# Patient Record
Sex: FEMALE | Race: WHITE | Employment: STUDENT | URBAN - METROPOLITAN AREA
[De-identification: names, ages, dates, MRNs, and addresses within clinical notes are randomized per-mention and may not be internally consistent; named-entity substitution may affect disease eponyms.]

---
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
## 2025-01-09 ENCOUNTER — TELEPHONE (OUTPATIENT)
Dept: FAMILY MEDICINE CLINIC | Facility: CLINIC | Age: 20
End: 2025-01-09

## 2025-01-17 ENCOUNTER — HOSPITAL ENCOUNTER (EMERGENCY)
Facility: HOSPITAL | Age: 20
Discharge: HOME | End: 2025-01-17
Attending: EMERGENCY MEDICINE
Payer: MEDICARE

## 2025-01-17 VITALS
RESPIRATION RATE: 18 BRPM | DIASTOLIC BLOOD PRESSURE: 58 MMHG | OXYGEN SATURATION: 99 % | TEMPERATURE: 98.2 F | SYSTOLIC BLOOD PRESSURE: 114 MMHG | HEART RATE: 82 BPM

## 2025-01-17 DIAGNOSIS — R19.7 DIARRHEA, UNSPECIFIED TYPE: ICD-10-CM

## 2025-01-17 DIAGNOSIS — R11.10 VOMITING: ICD-10-CM

## 2025-01-17 DIAGNOSIS — R10.84 GENERALIZED ABDOMINAL PAIN: Primary | ICD-10-CM

## 2025-01-17 LAB
ALBUMIN SERPL BCG-MCNC: 4.1 G/DL (ref 3.5–5)
ALP SERPL-CCNC: 61 U/L (ref 34–104)
ALT SERPL W P-5'-P-CCNC: 13 U/L (ref 7–52)
AMPHETAMINES SERPL QL SCN: NEGATIVE
ANION GAP SERPL CALCULATED.3IONS-SCNC: 3 MMOL/L (ref 4–13)
AST SERPL W P-5'-P-CCNC: 19 U/L (ref 13–39)
BARBITURATES UR QL: NEGATIVE
BASOPHILS # BLD AUTO: 0.04 THOUSANDS/ΜL (ref 0–0.1)
BASOPHILS NFR BLD AUTO: 1 % (ref 0–1)
BENZODIAZ UR QL: NEGATIVE
BILIRUB SERPL-MCNC: 0.39 MG/DL (ref 0.2–1)
BILIRUB UR QL STRIP: NEGATIVE
BUN SERPL-MCNC: 15 MG/DL (ref 5–25)
CALCIUM SERPL-MCNC: 9 MG/DL (ref 8.4–10.2)
CHLORIDE SERPL-SCNC: 106 MMOL/L (ref 96–108)
CLARITY UR: CLEAR
CO2 SERPL-SCNC: 29 MMOL/L (ref 21–32)
COCAINE UR QL: NEGATIVE
COLOR UR: YELLOW
CREAT SERPL-MCNC: 0.69 MG/DL (ref 0.6–1.3)
EOSINOPHIL # BLD AUTO: 0.09 THOUSAND/ΜL (ref 0–0.61)
EOSINOPHIL NFR BLD AUTO: 1 % (ref 0–6)
ERYTHROCYTE [DISTWIDTH] IN BLOOD BY AUTOMATED COUNT: 12.9 % (ref 11.6–15.1)
EXT PREGNANCY TEST URINE: NEGATIVE
FENTANYL UR QL SCN: NEGATIVE
GFR SERPL CREATININE-BSD FRML MDRD: 125 ML/MIN/1.73SQ M
GLUCOSE SERPL-MCNC: 71 MG/DL (ref 65–140)
GLUCOSE UR STRIP-MCNC: NEGATIVE MG/DL
HCT VFR BLD AUTO: 41 % (ref 34.8–46.1)
HGB BLD-MCNC: 13.7 G/DL (ref 11.5–15.4)
HGB UR QL STRIP.AUTO: NEGATIVE
HYDROCODONE UR QL SCN: NEGATIVE
IMM GRANULOCYTES # BLD AUTO: 0.04 THOUSAND/UL (ref 0–0.2)
IMM GRANULOCYTES NFR BLD AUTO: 1 % (ref 0–2)
KETONES UR STRIP-MCNC: NEGATIVE MG/DL
LEUKOCYTE ESTERASE UR QL STRIP: NEGATIVE
LIPASE SERPL-CCNC: 19 U/L (ref 11–82)
LYMPHOCYTES # BLD AUTO: 2.36 THOUSANDS/ΜL (ref 0.6–4.47)
LYMPHOCYTES NFR BLD AUTO: 31 % (ref 14–44)
MCH RBC QN AUTO: 32.6 PG (ref 26.8–34.3)
MCHC RBC AUTO-ENTMCNC: 33.4 G/DL (ref 31.4–37.4)
MCV RBC AUTO: 98 FL (ref 82–98)
METHADONE UR QL: NEGATIVE
MONOCYTES # BLD AUTO: 0.51 THOUSAND/ΜL (ref 0.17–1.22)
MONOCYTES NFR BLD AUTO: 7 % (ref 4–12)
NEUTROPHILS # BLD AUTO: 4.61 THOUSANDS/ΜL (ref 1.85–7.62)
NEUTS SEG NFR BLD AUTO: 59 % (ref 43–75)
NITRITE UR QL STRIP: NEGATIVE
NRBC BLD AUTO-RTO: 0 /100 WBCS
OPIATES UR QL SCN: NEGATIVE
OXYCODONE+OXYMORPHONE UR QL SCN: NEGATIVE
PCP UR QL: NEGATIVE
PH UR STRIP.AUTO: 7.5 [PH] (ref 4.5–8)
PLATELET # BLD AUTO: 282 THOUSANDS/UL (ref 149–390)
PMV BLD AUTO: 9.9 FL (ref 8.9–12.7)
POTASSIUM SERPL-SCNC: 3.8 MMOL/L (ref 3.5–5.3)
PROT SERPL-MCNC: 6.3 G/DL (ref 6.4–8.4)
PROT UR STRIP-MCNC: NEGATIVE MG/DL
RBC # BLD AUTO: 4.2 MILLION/UL (ref 3.81–5.12)
SODIUM SERPL-SCNC: 138 MMOL/L (ref 135–147)
SP GR UR STRIP.AUTO: 1.02 (ref 1–1.03)
THC UR QL: POSITIVE
UROBILINOGEN UR QL STRIP.AUTO: 0.2 E.U./DL
WBC # BLD AUTO: 7.65 THOUSAND/UL (ref 4.31–10.16)

## 2025-01-17 PROCEDURE — 80053 COMPREHEN METABOLIC PANEL: CPT | Performed by: EMERGENCY MEDICINE

## 2025-01-17 PROCEDURE — 80307 DRUG TEST PRSMV CHEM ANLYZR: CPT | Performed by: EMERGENCY MEDICINE

## 2025-01-17 PROCEDURE — 85025 COMPLETE CBC W/AUTO DIFF WBC: CPT | Performed by: EMERGENCY MEDICINE

## 2025-01-17 PROCEDURE — 96374 THER/PROPH/DIAG INJ IV PUSH: CPT

## 2025-01-17 PROCEDURE — 96361 HYDRATE IV INFUSION ADD-ON: CPT

## 2025-01-17 PROCEDURE — 36415 COLL VENOUS BLD VENIPUNCTURE: CPT | Performed by: EMERGENCY MEDICINE

## 2025-01-17 PROCEDURE — 99283 EMERGENCY DEPT VISIT LOW MDM: CPT

## 2025-01-17 PROCEDURE — 83690 ASSAY OF LIPASE: CPT | Performed by: EMERGENCY MEDICINE

## 2025-01-17 PROCEDURE — 81025 URINE PREGNANCY TEST: CPT | Performed by: EMERGENCY MEDICINE

## 2025-01-17 PROCEDURE — 81003 URINALYSIS AUTO W/O SCOPE: CPT

## 2025-01-17 PROCEDURE — 99284 EMERGENCY DEPT VISIT MOD MDM: CPT | Performed by: EMERGENCY MEDICINE

## 2025-01-17 RX ORDER — ONDANSETRON 2 MG/ML
4 INJECTION INTRAMUSCULAR; INTRAVENOUS ONCE
Status: COMPLETED | OUTPATIENT
Start: 2025-01-17 | End: 2025-01-17

## 2025-01-17 RX ORDER — ONDANSETRON 4 MG/1
4 TABLET, ORALLY DISINTEGRATING ORAL EVERY 8 HOURS PRN
Qty: 10 TABLET | Refills: 0 | Status: SHIPPED | OUTPATIENT
Start: 2025-01-17 | End: 2025-01-21

## 2025-01-17 RX ADMIN — ONDANSETRON 4 MG: 2 INJECTION INTRAMUSCULAR; INTRAVENOUS at 17:01

## 2025-01-17 RX ADMIN — SODIUM CHLORIDE 1000 ML: 0.9 INJECTION, SOLUTION INTRAVENOUS at 17:01

## 2025-01-17 NOTE — ED PROVIDER NOTES
Time reflects when diagnosis was documented in both MDM as applicable and the Disposition within this note       Time User Action Codes Description Comment    1/17/2025  7:08 PM Corazon De Luna Add [R10.84] Generalized abdominal pain     1/17/2025  7:08 PM Corazon De Luna Add [R11.10] Vomiting     1/17/2025  7:08 PM Corazon De Luna Add [R19.7] Diarrhea, unspecified type           ED Disposition       ED Disposition   Discharge    Condition   Good    Date/Time   Fri Jan 17, 2025  7:08 PM    Comment   Soo Cleveland discharge to home/self care.                   Assessment & Plan       Medical Decision Making  20-year-old female presents to the ED with ongoing symptoms of nausea, vomiting, diarrhea and abdominal cramping.  She was seen here on 12/22/2024 for these symptoms had blood work and a CT which were essentially unremarkable.  She was given Zofran which helps her nausea and Bentyl which she states does not help her pain.  She was supposed to follow-up with gastroenterology but states she is having trouble getting in.  She states recently her nausea and vomiting has started up again.  She denies any new medications or supplements, drug or alcohol use.  She does not think she could be pregnant.  On exam she looks well in no distress.  She does not appear to be dehydrated.  She is normal weight.  Her abdomen is nondistended and nontender.  Will recheck labs, give IV fluids and Zofran.  Uncertain etiology of her nausea and vomiting but do not suspect acute surgical abdomen.  Will rule out pregnancy.  Could be irritable bowel syndrome or possible colitis.  Ultimately she will need a follow-up with gastroenterology.    Amount and/or Complexity of Data Reviewed  Labs: ordered. Decision-making details documented in ED Course.    Risk  Prescription drug management.        ED Course as of 01/17/25 1910 Fri Jan 17, 2025   1635 Blood Pressure: 114/58   1635 Temperature: 98.2 °F (36.8 °C)   1636 Pulse: 82  "  1636 Respirations: 18   1636 SpO2: 99 %   1842 PREGNANCY TEST URINE: Negative   1842 Color, UA: Yellow   1842 Leukocytes, UA: Negative   1842 Nitrite, UA: Negative   1842 Blood, UA: Negative   1905 THC URINE(!): Positive   1905 WBC: 7.65   1905 Hemoglobin: 13.7   1906 Sodium: 138   1906 Potassium: 3.8   1906 GLUCOSE: 71   1906 Calcium: 9.0   1906 AST: 19   1906 ALT: 13   1906 ALK PHOS: 61   1906 LIPASE: 19       Medications   sodium chloride 0.9 % bolus 1,000 mL (0 mL Intravenous Stopped 1/17/25 1801)   ondansetron (ZOFRAN) injection 4 mg (4 mg Intravenous Given 1/17/25 1701)       ED Risk Strat Scores            CRAFFT      Flowsheet Row Most Recent Value   CRAFFT Initial Screen: During the past 12 months, did you:    1. Drink any alcohol (more than a few sips)?  No Filed at: 01/17/2025 1624   2. Smoke any marijuana or hashish No Filed at: 01/17/2025 1624   3. Use anything else to get high? (\"anything else\" includes illegal drugs, over the counter and prescription drugs, and things that you sniff or 'gillis')? No Filed at: 01/17/2025 1624                                          History of Present Illness       Chief Complaint   Patient presents with    Abdominal Pain     Pt c/o abd pain and persistent nausea for over a month. States she was here and they gave her zofran and referred her to pcp and GI. Did not go to GI appointment because she missed it. Reports the zofran only takes the nausea away for a bit and then always comes back.        No past medical history on file.   No past surgical history on file.   Family History   Problem Relation Age of Onset    ADD / ADHD Mother     ADD / ADHD Father     Diabetes Maternal Grandmother       Social History     Tobacco Use    Smoking status: Never    Smokeless tobacco: Never   Substance Use Topics    Alcohol use: Not Currently    Drug use: Yes     Types: Marijuana      E-Cigarette/Vaping      E-Cigarette/Vaping Substances      I have reviewed and agree with the " history as documented.     20-year-old female with history of anxiety presents to the emergency department with recurrence of nausea, vomiting, diarrhea and abdominal pain.  Patient states that she has had symptoms for about a month.  She states she vomits almost every day.  The vomitus is nonbloody and nonbilious.  She occasionally is able to keep some food and liquids down.  She also complains of episodes of nonbloody diarrhea that can happen almost every day as well.  She has been having episodes of lightheadedness and abdominal cramping.  No fevers or chills.  No recent weight loss.  She was seen here on 12/22/2024 for these complaints and had blood work and a CT of the abdomen and pelvis which showed fluid-filled loops of bowel consistent with gastroenteritis.  Patient was prescribed Zofran and Bentyl.  She states that Zofran does help her with her nausea but the Bentyl does not help with the pain.  Patient denies any new medications or supplements.  No drug or alcohol use.  Patient was supposed to follow-up with gastroenterology but states she has not been able to make an appointment.      History provided by:  Patient   used: No    Abdominal Pain  Pain location:  Generalized  Pain quality: cramping and sharp    Pain radiates to:  Does not radiate  Onset quality:  Gradual  Duration:  4 weeks  Timing:  Intermittent  Progression:  Unchanged  Chronicity:  Recurrent  Context: not alcohol use, not awakening from sleep, not diet changes, not eating, not previous surgeries, not recent illness, not recent travel, not sick contacts, not suspicious food intake and not trauma    Relieved by:  Nothing  Worsened by:  Nothing  Ineffective treatments: bentyl.  Associated symptoms: belching, diarrhea, nausea and vomiting    Associated symptoms: no anorexia, no chest pain, no chills, no constipation, no cough, no dysuria, no fatigue, no fever, no flatus, no hematemesis, no hematochezia, no hematuria, no  melena, no shortness of breath, no sore throat, no vaginal bleeding and no vaginal discharge    Diarrhea:     Quality:  Watery    Number of occurrences:  Few episodes in a day, diarrhea does not occur every day    Duration:  3 weeks  Vomiting:     Quality:  Stomach contents    Number of occurrences:  Vomiting does not occur every day    Duration:  3 weeks    Progression:  Unchanged  Risk factors: no alcohol abuse, no aspirin use, no NSAID use, not obese and not pregnant        Review of Systems   Constitutional:  Positive for appetite change. Negative for activity change, chills, diaphoresis, fatigue, fever and unexpected weight change.   HENT: Negative.  Negative for congestion, rhinorrhea and sore throat.    Eyes: Negative.  Negative for discharge, redness and itching.   Respiratory: Negative.  Negative for apnea, cough, chest tightness, shortness of breath and wheezing.    Cardiovascular:  Negative for chest pain, palpitations and leg swelling.   Gastrointestinal:  Positive for abdominal pain, diarrhea, nausea and vomiting. Negative for abdominal distention, anal bleeding, anorexia, blood in stool, constipation, flatus, hematemesis, hematochezia, melena and rectal pain.   Endocrine: Negative.    Genitourinary: Negative.  Negative for dysuria, flank pain, frequency, hematuria, urgency, vaginal bleeding and vaginal discharge.   Musculoskeletal: Negative.  Negative for back pain.   Skin: Negative.    Allergic/Immunologic: Negative.    Neurological: Negative.  Negative for dizziness, syncope, weakness, light-headedness, numbness and headaches.   Hematological: Negative.    All other systems reviewed and are negative.          Objective       ED Triage Vitals [01/17/25 1619]   Temperature Pulse Blood Pressure Respirations SpO2 Patient Position - Orthostatic VS   98.2 °F (36.8 °C) 82 114/58 18 99 % Sitting      Temp Source Heart Rate Source BP Location FiO2 (%) Pain Score    Oral Monitor Right arm -- 4      Vitals       Date and Time Temp Pulse SpO2 Resp BP Pain Score FACES Pain Rating User   01/17/25 1619 98.2 °F (36.8 °C) 82 99 % 18 114/58 4 -- LB            Physical Exam  Vitals and nursing note reviewed.   Constitutional:       General: She is awake. She is not in acute distress.     Appearance: Normal appearance. She is well-developed and normal weight. She is not ill-appearing, toxic-appearing or diaphoretic.   HENT:      Head: Normocephalic and atraumatic.      Right Ear: External ear normal.      Left Ear: External ear normal.      Nose: Nose normal.      Mouth/Throat:      Mouth: Mucous membranes are moist.   Eyes:      General: No scleral icterus.        Right eye: No discharge.         Left eye: No discharge.      Conjunctiva/sclera: Conjunctivae normal.   Neck:      Thyroid: No thyromegaly.      Vascular: No JVD.      Trachea: No tracheal deviation.   Cardiovascular:      Rate and Rhythm: Normal rate and regular rhythm.      Heart sounds: Normal heart sounds. No murmur heard.     No friction rub. No gallop.   Pulmonary:      Effort: Pulmonary effort is normal. No respiratory distress.      Breath sounds: Normal breath sounds. No stridor. No wheezing, rhonchi or rales.   Chest:      Chest wall: No tenderness.   Abdominal:      General: Abdomen is flat. Bowel sounds are normal. There is no distension.      Palpations: Abdomen is soft. There is no mass.      Tenderness: There is no abdominal tenderness.      Hernia: No hernia is present.   Skin:     General: Skin is warm and dry.      Coloration: Skin is not jaundiced or pale.      Findings: No bruising, erythema, lesion or rash.   Neurological:      General: No focal deficit present.      Mental Status: She is alert and oriented to person, place, and time.      Motor: No weakness or abnormal muscle tone.      Deep Tendon Reflexes: Reflexes are normal and symmetric.   Psychiatric:         Behavior: Behavior is cooperative.         Results Reviewed       Procedure  Component Value Units Date/Time    CBC and differential [[id number]] Collected: 01/17/25 1701    Lab Status: Final result Specimen: Blood from Arm, Left Updated: 01/17/25 1905     WBC 7.65 Thousand/uL      RBC 4.20 Million/uL      Hemoglobin 13.7 g/dL      Hematocrit 41.0 %      MCV 98 fL      MCH 32.6 pg      MCHC 33.4 g/dL      RDW 12.9 %      MPV 9.9 fL      Platelets 282 Thousands/uL      nRBC 0 /100 WBCs      Segmented % 59 %      Immature Grans % 1 %      Lymphocytes % 31 %      Monocytes % 7 %      Eosinophils Relative 1 %      Basophils Relative 1 %      Absolute Neutrophils 4.61 Thousands/µL      Absolute Immature Grans 0.04 Thousand/uL      Absolute Lymphocytes 2.36 Thousands/µL      Absolute Monocytes 0.51 Thousand/µL      Eosinophils Absolute 0.09 Thousand/µL      Basophils Absolute 0.04 Thousands/µL     Rapid drug screen, urine [[id number]]  (Abnormal) Collected: 01/17/25 1828    Lab Status: Final result Specimen: Urine, Clean Catch Updated: 01/17/25 1904     Amph/Meth UR Negative     Barbiturate Ur Negative     Benzodiazepine Urine Negative     Cocaine Urine Negative     Methadone Urine Negative     Opiate Urine Negative     PCP Ur Negative     THC Urine Positive     Oxycodone Urine Negative     Fentanyl Urine Negative     HYDROCODONE URINE Negative    Narrative:      Presumptive report. If requested, specimen will be sent to reference lab for confirmation.  FOR MEDICAL PURPOSES ONLY.   IF CONFIRMATION NEEDED PLEASE CONTACT THE LAB WITHIN 5 DAYS.    Drug Screen Cutoff Levels:  AMPHETAMINE/METHAMPHETAMINES  1000 ng/mL  BARBITURATES     200 ng/mL  BENZODIAZEPINES     200 ng/mL  COCAINE      300 ng/mL  METHADONE      300 ng/mL  OPIATES      300 ng/mL  PHENCYCLIDINE     25 ng/mL  THC       50 ng/mL  OXYCODONE      100 ng/mL  FENTANYL      5 ng/mL  HYDROCODONE     300 ng/mL    Comprehensive metabolic panel [[id number]]  (Abnormal) Collected: 01/17/25 1701    Lab Status: Final result Specimen: Blood from Arm,  Left Updated: 01/17/25 1842     Sodium 138 mmol/L      Potassium 3.8 mmol/L      Chloride 106 mmol/L      CO2 29 mmol/L      ANION GAP 3 mmol/L      BUN 15 mg/dL      Creatinine 0.69 mg/dL      Glucose 71 mg/dL      Calcium 9.0 mg/dL      AST 19 U/L      ALT 13 U/L      Alkaline Phosphatase 61 U/L      Total Protein 6.3 g/dL      Albumin 4.1 g/dL      Total Bilirubin 0.39 mg/dL      eGFR 125 ml/min/1.73sq m     Narrative:      National Kid[employer] Foundation guidelines for Chronic Kidney Disease (CKD):     Stage 1 with normal or high GFR (GFR > 90 mL/min/1.73 square meters)    Stage 2 Mild CKD (GFR = 60-89 mL/min/1.73 square meters)    Stage 3A Moderate CKD (GFR = 45-59 mL/min/1.73 square meters)    Stage 3B Moderate CKD (GFR = 30-44 mL/min/1.73 square meters)    Stage 4 Severe CKD (GFR = 15-29 mL/min/1.73 square meters)    Stage 5 End Stage CKD (GFR <15 mL/min/1.73 square meters)  Note: GFR calculation is accurate only with a steady state creatinine    Lipase [[id number]]  (Normal) Collected: 01/17/25 1701    Lab Status: Final result Specimen: Blood from Arm, Left Updated: 01/17/25 1842     Lipase 19 u/L     POCT pregnancy, urine [[id number]]  (Normal) Collected: 01/17/25 1834    Lab Status: Final result Updated: 01/17/25 1834     EXT Preg Test, Ur Negative     Control --    Urine Macroscopic, POC [[id number]] Collected: 01/17/25 1831    Lab Status: Final result Specimen: Urine Updated: 01/17/25 1833     Color, UA Yellow     Clarity, UA Clear     pH, UA 7.5     Leukocytes, UA Negative     Nitrite, UA Negative     Protein, UA Negative mg/dl      Glucose, UA Negative mg/dl      Ketones, UA Negative mg/dl      Urobilinogen, UA 0.2 E.U./dl      Bilirubin, UA Negative     Occult Blood, UA Negative     Specific Gravity, UA 1.025    Narrative:      CLINITEK RESULT            No orders to display       Procedures    ED Medication and Procedure Management   Prior to Admission Medications   Prescriptions Last Dose Informant  Patient Reported? Taking?   dicyclomine (BENTYL) 20 mg tablet   No No   Sig: Take 1 tablet (20 mg total) by mouth 2 (two) times a day   ondansetron (ZOFRAN-ODT) 4 mg disintegrating tablet   No No   Sig: Take 1 tablet (4 mg total) by mouth every 8 (eight) hours as needed for nausea or vomiting   sodium chloride (OCEAN) 0.65 % nasal spray   Yes No   Si spray into each nostril   valACYclovir (VALTREX) 1,000 mg tablet   No No   Sig: Take 1 tablet (1,000 mg total) by mouth 2 (two) times a day for 7 days      Facility-Administered Medications: None     Patient's Medications   Discharge Prescriptions    ONDANSETRON (ZOFRAN-ODT) 4 MG DISINTEGRATING TABLET    Take 1 tablet (4 mg total) by mouth every 8 (eight) hours as needed for vomiting or nausea       Start Date: 2025 End Date: --       Order Dose: 4 mg       Quantity: 10 tablet    Refills: 0     No discharge procedures on file.  ED SEPSIS DOCUMENTATION   Time reflects when diagnosis was documented in both MDM as applicable and the Disposition within this note       Time User Action Codes Description Comment    2025  7:08 PM Corazon De Luna Add [R10.84] Generalized abdominal pain     2025  7:08 PM Corazon De Luna Add [R11.10] Vomiting     2025  7:08 PM Corazon De Luna Add [R19.7] Diarrhea, unspecified type                  Corazon De Luna,   25 1920

## 2025-01-17 NOTE — Clinical Note
Soo Cleveland was seen and treated in our emergency department on 1/17/2025.    No restrictions            Diagnosis:     Soo  may return to work on return date.    She may return on this date: 01/20/2025         If you have any questions or concerns, please don't hesitate to call.      Corazon De Luna, DO    ______________________________           _______________          _______________  Hospital Representative                              Date                                Time

## 2025-01-18 NOTE — DISCHARGE INSTRUCTIONS
If you are smoking marijuana frequently, this may cause excessive vomiting.  Try to cut back or stop smoking marijuana

## 2025-01-21 ENCOUNTER — OFFICE VISIT (OUTPATIENT)
Dept: GASTROENTEROLOGY | Facility: AMBULARY SURGERY CENTER | Age: 20
End: 2025-01-21
Payer: MEDICARE

## 2025-01-21 ENCOUNTER — APPOINTMENT (OUTPATIENT)
Dept: LAB | Facility: AMBULARY SURGERY CENTER | Age: 20
End: 2025-01-21
Payer: MEDICARE

## 2025-01-21 ENCOUNTER — APPOINTMENT (OUTPATIENT)
Dept: LAB | Facility: AMBULARY SURGERY CENTER | Age: 20
End: 2025-01-21
Attending: INTERNAL MEDICINE
Payer: MEDICARE

## 2025-01-21 ENCOUNTER — TELEPHONE (OUTPATIENT)
Age: 20
End: 2025-01-21

## 2025-01-21 VITALS
SYSTOLIC BLOOD PRESSURE: 118 MMHG | OXYGEN SATURATION: 99 % | DIASTOLIC BLOOD PRESSURE: 68 MMHG | BODY MASS INDEX: 26.83 KG/M2 | HEIGHT: 62 IN | HEART RATE: 108 BPM | WEIGHT: 145.8 LBS

## 2025-01-21 DIAGNOSIS — R19.7 DIARRHEA, UNSPECIFIED TYPE: ICD-10-CM

## 2025-01-21 DIAGNOSIS — R11.0 NAUSEA: ICD-10-CM

## 2025-01-21 DIAGNOSIS — R11.0 NAUSEA: Primary | ICD-10-CM

## 2025-01-21 DIAGNOSIS — R11.10 VOMITING: ICD-10-CM

## 2025-01-21 LAB
IGA SERPL-MCNC: 237 MG/DL (ref 66–433)
TSH SERPL DL<=0.05 MIU/L-ACNC: 1.42 UIU/ML (ref 0.45–4.5)

## 2025-01-21 PROCEDURE — 99204 OFFICE O/P NEW MOD 45 MIN: CPT | Performed by: INTERNAL MEDICINE

## 2025-01-21 PROCEDURE — 86364 TISS TRNSGLTMNASE EA IG CLAS: CPT

## 2025-01-21 PROCEDURE — 82784 ASSAY IGA/IGD/IGG/IGM EACH: CPT

## 2025-01-21 PROCEDURE — 84443 ASSAY THYROID STIM HORMONE: CPT

## 2025-01-21 PROCEDURE — 36415 COLL VENOUS BLD VENIPUNCTURE: CPT

## 2025-01-21 RX ORDER — OMEPRAZOLE 40 MG/1
40 CAPSULE, DELAYED RELEASE ORAL DAILY
Qty: 90 CAPSULE | Refills: 3 | Status: SHIPPED | OUTPATIENT
Start: 2025-01-21

## 2025-01-21 RX ORDER — ONDANSETRON 4 MG/1
4 TABLET, ORALLY DISINTEGRATING ORAL EVERY 6 HOURS SCHEDULED
Qty: 30 TABLET | Refills: 1 | Status: SHIPPED | OUTPATIENT
Start: 2025-01-21

## 2025-01-21 RX ORDER — SODIUM CHLORIDE, SODIUM LACTATE, POTASSIUM CHLORIDE, CALCIUM CHLORIDE 600; 310; 30; 20 MG/100ML; MG/100ML; MG/100ML; MG/100ML
125 INJECTION, SOLUTION INTRAVENOUS CONTINUOUS
OUTPATIENT
Start: 2025-01-21

## 2025-01-21 NOTE — PATIENT INSTRUCTIONS
Scheduled date of EGD(as of today): 3/6/25  Physician performing EGD: Dr. Gunter  Location of EGD: [facility]  Instructions reviewed with patient by: Regina DE PAZ  Clearances: n/a

## 2025-01-21 NOTE — ASSESSMENT & PLAN NOTE
Most consistent with marijuana hyperemesis syndrome/poor improvement given marijuana use  Counseled on discontinuation of marijuana use  Will assess for alternative etiologies, including evaluate for celiac, persistent infectious etiology  Plan for EGD to rule out gastric outlet obstruction  Empiric treatment with PPI  Obtain RUQ ultrasound to assess for biliary etiology  Orders:    Celiac Disease Panel; Future    Giardia antigen; Future    Clostridium difficile toxin by PCR with EIA; Future    TSH, 3rd generation with Free T4 reflex; Future    EGD; Future    US right upper quadrant; Future    omeprazole (PriLOSEC) 40 MG capsule; Take 1 capsule (40 mg total) by mouth daily

## 2025-01-21 NOTE — PROGRESS NOTES
Name: Soo Cleveland      : 2005      MRN: [id number]  Encounter Provider: Madelin Gunter MD  Encounter Date: 2025   Encounter department: [facility]  :  Assessment & Plan  Nausea  Most consistent with marijuana hyperemesis syndrome/poor improvement given marijuana use  Counseled on discontinuation of marijuana use  Will assess for alternative etiologies, including evaluate for celiac, persistent infectious etiology  Plan for EGD to rule out gastric outlet obstruction  Empiric treatment with PPI  Obtain RUQ ultrasound to assess for biliary etiology  Orders:    Celiac Disease Panel; Future    Giardia antigen; Future    Clostridium difficile toxin by PCR with EIA; Future    TSH, 3rd generation with Free T4 reflex; Future    EGD; Future    US right upper quadrant; Future    omeprazole (PriLOSEC) 40 MG capsule; Take 1 capsule (40 mg total) by mouth daily    Diarrhea, unspecified type  Acute onset of nausea/vomiting/diarrhea in Dec 2024- most consistent with infectious gastroenteritis,   Currently these symptoms have resolved  Orders:    Celiac Disease Panel; Future    Giardia antigen; Future    Clostridium difficile toxin by PCR with EIA; Future    TSH, 3rd generation with Free T4 reflex; Future        History of Present Illness   HPI  Soo Cleveland is a 20 y.o. female who presents with nausea.  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    For the past 1 months she describes nausea/vomiting, diarrhea.  She initially presented to the ED on 2024, with repeat ED visit 4 days ago for persistent symptoms.  She persists with daily episodes of vomiting and nausea.  The symptoms are worse after eating and drinking.  They have been refractory despite use of Zofran 4 mg, although she only takes this once a day.  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  She describes associated drowsiness, weakness, shortness of breath.  Her bowel movements have improved, although she initially had diarrhea, she is having 2-3 bowel movements a day now after meals.  She denies any rectal bleeding.  She has had 6 pounds of unintentional weight loss.  She has nocturnal symptoms 3 times a week.    She has been prescribed Zofran, Bentyl.    CT abdomen/pelvis December 2024, lack of enteric contrast-multiple loops of nondilated fluid-filled small bowel secondary to gastroenteritis, personally reviewed images on PACS    She rarely takes ibuprofen for headaches.  She takes vitamin C and iron for cold intolerance and easy bruising.  She smokes marijuana daily.  She denies any alcohol or tobacco use.  She has no family history of celiac, IBD, GI cancers.        Review of Systems   Constitutional:  Positive for appetite change, fatigue and unexpected weight change.   Respiratory:  Positive for shortness of breath.    Gastrointestinal:  Positive for diarrhea and nausea.   Neurological:  Positive for headaches.   Hematological:  Bruises/bleeds easily.     Past Medical History   History reviewed. No pertinent past medical history.  History reviewed. No pertinent surgical history.  Family History   Problem Relation Age of Onset    ADD / ADHD Mother     ADD / ADHD Father     Diabetes Maternal Grandmother       reports that she has never smoked. She has never used smokeless tobacco. She reports that she does not currently use alcohol. She reports current drug use. Drug: Marijuana.  Current Outpatient Medications on File Prior to Visit   Medication Sig Dispense Refill    dicyclomine (BENTYL) 20 mg tablet Take 1 tablet (20 mg total) by mouth 2 (two) times a day 20 tablet 0    sodium chloride (OCEAN) 0.65 % nasal spray 1 spray into each nostril      valACYclovir (VALTREX) 1,000 mg tablet Take 1 tablet  "(1,000 mg total) by mouth 2 (two) times a day for 7 days 14 tablet 0     No current facility-administered medications on file prior to visit.     Allergies   Allergen Reactions    Amoxicillin Rash and Anaphylaxis    Vancomycin Swelling and Other (See Comments)     Red man syndrome         Objective   /68 (BP Location: Left arm, Patient Position: Sitting, Cuff Size: Standard)   Pulse (!) 108   Ht 5' 2\" (1.575 m)   Wt 66.1 kg (145 lb 12.8 oz)   LMP 12/26/2024 (Approximate)   SpO2 99%   BMI 26.67 kg/m²      Physical Exam  Vitals and nursing note reviewed.   Constitutional:       General: She is not in acute distress.     Appearance: Normal appearance.   HENT:      Head: Normocephalic and atraumatic.   Eyes:      Conjunctiva/sclera: Conjunctivae normal.   Cardiovascular:      Rate and Rhythm: Normal rate and regular rhythm.   Pulmonary:      Effort: Pulmonary effort is normal. No respiratory distress.      Breath sounds: Normal breath sounds.   Abdominal:      Palpations: Abdomen is soft.      Tenderness: There is abdominal tenderness.   Musculoskeletal:         General: Normal range of motion.      Cervical back: Neck supple.   Skin:     General: Skin is warm and dry.   Neurological:      Mental Status: She is alert.   Psychiatric:         Mood and Affect: Mood normal.     Lab Results   Component Value Date    WBC 7.65 01/17/2025    HGB 13.7 01/17/2025    HCT 41.0 01/17/2025    MCV 98 01/17/2025     01/17/2025       Lab Results   Component Value Date    SODIUM 138 01/17/2025    K 3.8 01/17/2025     01/17/2025    CO2 29 01/17/2025    AGAP 3 (L) 01/17/2025    BUN 15 01/17/2025    CREATININE 0.69 01/17/2025    GLUC 71 01/17/2025    CALCIUM 9.0 01/17/2025    AST 19 01/17/2025    ALT 13 01/17/2025    ALKPHOS 61 01/17/2025    TP 6.3 (L) 01/17/2025    TBILI 0.39 01/17/2025    EGFR 125 01/17/2025       Personally reviewed recent lab results      "

## 2025-01-21 NOTE — TELEPHONE ENCOUNTER
Patients GI provider:  Dr. Gunter     Number to return call: [phone]     Reason for call: Pt calling requesting to speak with Dr Gunter regarding KIMBERLEE or an accomodation for work.     Scheduled procedure/appointment date if applicable: procedure 3/6/25

## 2025-01-22 ENCOUNTER — RESULTS FOLLOW-UP (OUTPATIENT)
Dept: GASTROENTEROLOGY | Facility: AMBULARY SURGERY CENTER | Age: 20
End: 2025-01-22

## 2025-01-22 LAB — TTG IGA SER IA-ACNC: 0.6 U/ML (ref ?–10)

## 2025-01-22 NOTE — TELEPHONE ENCOUNTER
Patients GI provider:  Dr. Gunter      Number to return call: [phone]      Reason for call: Pt calling requesting to speak with Dr Gunter regarding KIMBERLEE or an accomodation for work. Patient is requesting a call back today. Thank you.     Scheduled procedure/appointment date if applicable: procedure 3/6/25

## 2025-01-22 NOTE — TELEPHONE ENCOUNTER
Spoke with patient via phone.  Patient states her condition is affecting her ability to work.  She states she vomits frequently, nausea, blurred vision, dizziness, passes out and generalized weakness.  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  She is asking for a KIMBERLEE or shorter work days (4 hour shifts).  Please advise.

## 2025-01-23 NOTE — TELEPHONE ENCOUNTER
Patient called and stated her employer is requesting FMLA,  please review she requested a call back directly from DR Gunter to better explain her situation, she said she is having severe symptoms such as vomiting and has been sent home almost everyday because of her issues please review and reach, she only asked a call back from Dr Gunter. Thank you

## 2025-01-24 ENCOUNTER — PATIENT MESSAGE (OUTPATIENT)
Dept: GASTROENTEROLOGY | Facility: AMBULARY SURGERY CENTER | Age: 20
End: 2025-01-24

## 2025-01-24 NOTE — TELEPHONE ENCOUNTER
Please let patient know that Dr. Gunter is unavailable today. Dr Gunter will need to be given the paperwork and have time to review them.  Please make sure patient is taking Zofran 3 times daily as recommended

## 2025-01-24 NOTE — TELEPHONE ENCOUNTER
Lvm with provider note:    Please let patient know that Dr. Gunter is unavailable today. Dr Gunter will need to be given the paperwork and have time to review them.  Please make sure patient is taking Zofran 3 times daily as recommended

## 2025-01-24 NOTE — TELEPHONE ENCOUNTER
Pt returning call to confirm how to get the paperwork to Dr. Gunter, verified fax number and verified mychart.

## 2025-01-28 DIAGNOSIS — R19.7 DIARRHEA OF PRESUMED INFECTIOUS ORIGIN: Primary | ICD-10-CM

## 2025-02-13 ENCOUNTER — TELEPHONE (OUTPATIENT)
Dept: GASTROENTEROLOGY | Facility: AMBULARY SURGERY CENTER | Age: 20
End: 2025-02-13

## 2025-02-18 ENCOUNTER — TELEPHONE (OUTPATIENT)
Dept: GASTROENTEROLOGY | Facility: AMBULARY SURGERY CENTER | Age: 20
End: 2025-02-18

## 2025-02-20 ENCOUNTER — ANESTHESIA (OUTPATIENT)
Dept: ANESTHESIOLOGY | Facility: HOSPITAL | Age: 20
End: 2025-02-20

## 2025-02-20 ENCOUNTER — ANESTHESIA EVENT (OUTPATIENT)
Dept: ANESTHESIOLOGY | Facility: HOSPITAL | Age: 20
End: 2025-02-20

## 2025-05-16 ENCOUNTER — TELEPHONE (OUTPATIENT)
Dept: GASTROENTEROLOGY | Facility: AMBULARY SURGERY CENTER | Age: 20
End: 2025-05-16

## 2025-05-16 NOTE — TELEPHONE ENCOUNTER
----- Message from Regina DE PAZ sent at 5/14/2025 10:48 AM EDT -----  Regarding: reschedule EGD w/ Dr. Gunter  Please reach out to patient to reschedule EGD with Dr. Gunter. Thank you